# Patient Record
(demographics unavailable — no encounter records)

---

## 2024-10-15 NOTE — HISTORY OF PRESENT ILLNESS
[FreeTextEntry1] : Chief complaint; followup after colonoscopy   HPI Patient presents for Gastroenterology followup because of multiple gastrointestinal signs and symptoms. Patient with dyspepsia, epigastric tenderness, feels well after colonoscopy examination. cOLONOSCOPY AND HISTOPATHOLOGY REPORTS WERE REVIEWED WITH THE PATIENT. patient HAD TWO POLYPS WHICH WERE REMOVED WITH SNARE POLYPECTOMY.   patient WITH NORMAL WEIGHT AD APPETITE, NO ABDOINAL PAIN AND NO SIG OF ACUTE GI BLEEDING.

## 2024-10-15 NOTE — ASSESSMENT
[FreeTextEntry1] : Patient present for evaluation and management of multiple complex Gastrintestinal sign and symptoms   Feels well after colonoscopy; patient had two plyps which wereremoved with colnoscopic polypectomy   Patient with no abdominal pain an no abdominal tenderness to palpation   Colonoscopy and histopathology reports were reviewed with patient   moderate degree of complexity of medical decision making were involved in the patient encounter